# Patient Record
Sex: FEMALE | Employment: FULL TIME | URBAN - METROPOLITAN AREA
[De-identification: names, ages, dates, MRNs, and addresses within clinical notes are randomized per-mention and may not be internally consistent; named-entity substitution may affect disease eponyms.]

---

## 2023-07-20 ENCOUNTER — OFFICE VISIT (OUTPATIENT)
Dept: PRIMARY CARE | Facility: CLINIC | Age: 48
End: 2023-07-20
Payer: COMMERCIAL

## 2023-07-20 VITALS
HEART RATE: 79 BPM | BODY MASS INDEX: 44.26 KG/M2 | RESPIRATION RATE: 18 BRPM | WEIGHT: 240.5 LBS | SYSTOLIC BLOOD PRESSURE: 126 MMHG | OXYGEN SATURATION: 98 % | TEMPERATURE: 98.6 F | HEIGHT: 62 IN | DIASTOLIC BLOOD PRESSURE: 84 MMHG

## 2023-07-20 DIAGNOSIS — Z00.00 PHYSICAL EXAM: Primary | ICD-10-CM

## 2023-07-20 PROCEDURE — 1036F TOBACCO NON-USER: CPT | Performed by: NURSE PRACTITIONER

## 2023-07-20 PROCEDURE — 99396 PREV VISIT EST AGE 40-64: CPT | Performed by: NURSE PRACTITIONER

## 2023-07-20 ASSESSMENT — PATIENT HEALTH QUESTIONNAIRE - PHQ9
2. FEELING DOWN, DEPRESSED OR HOPELESS: NOT AT ALL
SUM OF ALL RESPONSES TO PHQ9 QUESTIONS 1 AND 2: 0
1. LITTLE INTEREST OR PLEASURE IN DOING THINGS: NOT AT ALL

## 2023-07-20 ASSESSMENT — PAIN SCALES - GENERAL: PAINLEVEL: 0-NO PAIN

## 2023-07-20 NOTE — PROGRESS NOTES
"Subjective   Patient ID: Luma Schroeder is a 47 y.o. female who presents for Establish Care (Reports she hasn't been to a PCP in years. New patient physical. ).    HPI     Patient presents to clinic to establish care.  She tells me she was last seen by PCP approximately 10-11 years ago.    Past medical history of transient global anemia-States she lost her memory for 10 hours was seen in the ER and a neurologist had a work-up with negative results.  She states she had these episodes in 2018, 2019 and 2021, also history of  Plantar fasciitis.  Denies taking any daily medications.    Today she complains of leg pain in the thighs that hurts mostly at night.  She previously had some knee pain seen by chiropractor had an adjustment in the pain in her legs resolved.  She states she has a very physical job and is on her feet for many hours.    She tells me she will be going to a retreat in [place] and needs a letter stating that she is healthy enough to travel.       Declines mammogram   Immunizations:   COVID-vaccine x2x1 booster   Declines Tdap.      Appetite normal bowel and bladder normal.    Review of Systems   Musculoskeletal:         See HPI   All other systems reviewed and are negative.      Objective   /84 (BP Location: Right arm, Patient Position: Sitting, BP Cuff Size: Large adult)   Pulse 79   Temp 37 °C (98.6 °F) (Temporal)   Resp 18   Ht 1.575 m (5' 2\")   Wt 109 kg (240 lb 8 oz)   SpO2 98%   BMI 43.99 kg/m²     Physical Exam  Vitals reviewed.   Constitutional:       Appearance: Normal appearance. She is not ill-appearing.   HENT:      Right Ear: Tympanic membrane and external ear normal.      Left Ear: Tympanic membrane and external ear normal.      Mouth/Throat:      Mouth: Mucous membranes are moist.      Pharynx: Oropharynx is clear.   Eyes:      Pupils: Pupils are equal, round, and reactive to light.   Neck:      Thyroid: No thyroid mass, thyromegaly or thyroid tenderness. "   Cardiovascular:      Rate and Rhythm: Normal rate and regular rhythm.   Pulmonary:      Effort: Pulmonary effort is normal.      Breath sounds: Normal breath sounds.   Abdominal:      General: Bowel sounds are normal.      Palpations: Abdomen is soft.   Musculoskeletal:         General: No swelling or tenderness. Normal range of motion.      Cervical back: Normal range of motion and neck supple.      Right lower leg: No edema.      Left lower leg: No edema.   Skin:     General: Skin is warm and dry.   Neurological:      Mental Status: She is alert and oriented to person, place, and time.   Psychiatric:         Mood and Affect: Mood normal.         Thought Content: Thought content normal.         Assessment/Plan   Problem List Items Addressed This Visit    None  Visit Diagnoses       Physical exam    -  Primary    Relevant Orders    CBC    Comprehensive Metabolic Panel    Lipid Panel    Hemoglobin A1C    Tsh With Reflex To Free T4 If Abnormal    Vitamin D 25-Hydroxy,Total    Referral to Obstetrics / Gynecology    Magnesium

## 2023-07-20 NOTE — PATIENT INSTRUCTIONS
Healthy diet and drink plenty of water.  Please have labs drawn-You will be notified with abnormal results only.    SEE MY CHART FOR RESULTS.- If you have any questions please do not hesitate to notify our office.    Please schedule all necessary health screenings ophthalmology and dentist.    Follow-up in 6 MONTHS TO A YEAR SOONER IF NEEDED.  Call office any new concerns.

## 2023-07-25 ENCOUNTER — LAB (OUTPATIENT)
Dept: LAB | Facility: LAB | Age: 48
End: 2023-07-25
Payer: COMMERCIAL

## 2023-07-25 DIAGNOSIS — Z00.00 PHYSICAL EXAM: ICD-10-CM

## 2023-07-25 LAB
ALANINE AMINOTRANSFERASE (SGPT) (U/L) IN SER/PLAS: 7 U/L (ref 7–45)
ALBUMIN (G/DL) IN SER/PLAS: 4.2 G/DL (ref 3.4–5)
ALKALINE PHOSPHATASE (U/L) IN SER/PLAS: 85 U/L (ref 33–110)
ANION GAP IN SER/PLAS: 14 MMOL/L (ref 10–20)
ASPARTATE AMINOTRANSFERASE (SGOT) (U/L) IN SER/PLAS: 9 U/L (ref 9–39)
BILIRUBIN TOTAL (MG/DL) IN SER/PLAS: 0.3 MG/DL (ref 0–1.2)
CALCIDIOL (25 OH VITAMIN D3) (NG/ML) IN SER/PLAS: 34 NG/ML
CALCIUM (MG/DL) IN SER/PLAS: 8.9 MG/DL (ref 8.6–10.3)
CARBON DIOXIDE, TOTAL (MMOL/L) IN SER/PLAS: 25 MMOL/L (ref 21–32)
CHLORIDE (MMOL/L) IN SER/PLAS: 106 MMOL/L (ref 98–107)
CHOLESTEROL (MG/DL) IN SER/PLAS: 178 MG/DL (ref 0–199)
CHOLESTEROL IN HDL (MG/DL) IN SER/PLAS: 40 MG/DL
CHOLESTEROL/HDL RATIO: 4.5
CREATININE (MG/DL) IN SER/PLAS: 0.76 MG/DL (ref 0.5–1.05)
ERYTHROCYTE DISTRIBUTION WIDTH (RATIO) BY AUTOMATED COUNT: 13.4 % (ref 11.5–14.5)
ERYTHROCYTE MEAN CORPUSCULAR HEMOGLOBIN CONCENTRATION (G/DL) BY AUTOMATED: 32.2 G/DL (ref 32–36)
ERYTHROCYTE MEAN CORPUSCULAR VOLUME (FL) BY AUTOMATED COUNT: 88 FL (ref 80–100)
ERYTHROCYTES (10*6/UL) IN BLOOD BY AUTOMATED COUNT: 4.58 X10E12/L (ref 4–5.2)
ESTIMATED AVERAGE GLUCOSE FOR HBA1C: 111 MG/DL
GFR FEMALE: >90 ML/MIN/1.73M2
GLUCOSE (MG/DL) IN SER/PLAS: 80 MG/DL (ref 74–99)
HEMATOCRIT (%) IN BLOOD BY AUTOMATED COUNT: 40.4 % (ref 36–46)
HEMOGLOBIN (G/DL) IN BLOOD: 13 G/DL (ref 12–16)
HEMOGLOBIN A1C/HEMOGLOBIN TOTAL IN BLOOD: 5.5 %
LDL: 115 MG/DL (ref 0–99)
LEUKOCYTES (10*3/UL) IN BLOOD BY AUTOMATED COUNT: 10.3 X10E9/L (ref 4.4–11.3)
MAGNESIUM (MG/DL) IN SER/PLAS: 1.91 MG/DL (ref 1.6–2.4)
NRBC (PER 100 WBCS) BY AUTOMATED COUNT: 0 /100 WBC (ref 0–0)
PLATELETS (10*3/UL) IN BLOOD AUTOMATED COUNT: 292 X10E9/L (ref 150–450)
POTASSIUM (MMOL/L) IN SER/PLAS: 4.5 MMOL/L (ref 3.5–5.3)
PROTEIN TOTAL: 6.6 G/DL (ref 6.4–8.2)
SODIUM (MMOL/L) IN SER/PLAS: 140 MMOL/L (ref 136–145)
THYROTROPIN (MIU/L) IN SER/PLAS BY DETECTION LIMIT <= 0.05 MIU/L: 1.23 MIU/L (ref 0.44–3.98)
TRIGLYCERIDE (MG/DL) IN SER/PLAS: 115 MG/DL (ref 0–149)
UREA NITROGEN (MG/DL) IN SER/PLAS: 14 MG/DL (ref 6–23)
VLDL: 23 MG/DL (ref 0–40)

## 2023-07-25 PROCEDURE — 82306 VITAMIN D 25 HYDROXY: CPT

## 2023-07-25 PROCEDURE — 85027 COMPLETE CBC AUTOMATED: CPT

## 2023-07-25 PROCEDURE — 36415 COLL VENOUS BLD VENIPUNCTURE: CPT

## 2023-07-25 PROCEDURE — 80053 COMPREHEN METABOLIC PANEL: CPT

## 2023-07-25 PROCEDURE — 80061 LIPID PANEL: CPT

## 2023-07-25 PROCEDURE — 83735 ASSAY OF MAGNESIUM: CPT

## 2023-07-25 PROCEDURE — 83036 HEMOGLOBIN GLYCOSYLATED A1C: CPT

## 2023-07-25 PROCEDURE — 84443 ASSAY THYROID STIM HORMONE: CPT

## 2023-07-26 ENCOUNTER — TELEPHONE (OUTPATIENT)
Dept: PRIMARY CARE | Facility: CLINIC | Age: 48
End: 2023-07-26
Payer: COMMERCIAL

## 2023-07-26 NOTE — TELEPHONE ENCOUNTER
----- Message from MINISTERIO Thakkar sent at 7/26/2023  7:40 AM EDT -----  Regarding: Labs  Please notify patient that all labs are normal except minor elevation noted with cholesterol level LDL.  Advised healthy lifestyle changes with a low-cholesterol low-fat diet eating more plant-based foods and exercising regularly.  ----- Message -----  From: Lab, Background User  Sent: 7/25/2023   2:43 PM EDT  To: MINISTERIO Thakkar

## 2023-10-28 ENCOUNTER — OFFICE VISIT (OUTPATIENT)
Dept: URGENT CARE | Facility: CLINIC | Age: 48
End: 2023-10-28
Payer: COMMERCIAL

## 2023-10-28 VITALS
DIASTOLIC BLOOD PRESSURE: 97 MMHG | RESPIRATION RATE: 18 BRPM | OXYGEN SATURATION: 98 % | HEART RATE: 88 BPM | TEMPERATURE: 98.3 F | SYSTOLIC BLOOD PRESSURE: 140 MMHG

## 2023-10-28 DIAGNOSIS — B96.89 BACTERIAL VAGINOSIS: ICD-10-CM

## 2023-10-28 DIAGNOSIS — N30.01 ACUTE CYSTITIS WITH HEMATURIA: Primary | ICD-10-CM

## 2023-10-28 DIAGNOSIS — N76.0 ACUTE VAGINITIS: ICD-10-CM

## 2023-10-28 DIAGNOSIS — N76.0 BACTERIAL VAGINOSIS: ICD-10-CM

## 2023-10-28 LAB
POC APPEARANCE, URINE: ABNORMAL
POC BILIRUBIN, URINE: NEGATIVE
POC BLOOD, URINE: ABNORMAL
POC COLOR, URINE: YELLOW
POC GLUCOSE, URINE: NEGATIVE MG/DL
POC KETONES, URINE: NEGATIVE MG/DL
POC LEUKOCYTES, URINE: ABNORMAL
POC NITRITE,URINE: POSITIVE
POC PH, URINE: 6 PH
POC PROTEIN, URINE: ABNORMAL MG/DL
POC SPECIFIC GRAVITY, URINE: 1.02
POC UROBILINOGEN, URINE: 0.2 EU/DL

## 2023-10-28 PROCEDURE — 1036F TOBACCO NON-USER: CPT | Performed by: PHYSICIAN ASSISTANT

## 2023-10-28 PROCEDURE — 87186 SC STD MICRODIL/AGAR DIL: CPT

## 2023-10-28 PROCEDURE — 87205 SMEAR GRAM STAIN: CPT

## 2023-10-28 PROCEDURE — 87086 URINE CULTURE/COLONY COUNT: CPT

## 2023-10-28 PROCEDURE — 81002 URINALYSIS NONAUTO W/O SCOPE: CPT | Performed by: PHYSICIAN ASSISTANT

## 2023-10-28 PROCEDURE — 87661 TRICHOMONAS VAGINALIS AMPLIF: CPT

## 2023-10-28 PROCEDURE — 87591 N.GONORRHOEAE DNA AMP PROB: CPT

## 2023-10-28 PROCEDURE — 87800 DETECT AGNT MULT DNA DIREC: CPT

## 2023-10-28 PROCEDURE — 87491 CHLMYD TRACH DNA AMP PROBE: CPT

## 2023-10-28 PROCEDURE — 99204 OFFICE O/P NEW MOD 45 MIN: CPT | Performed by: PHYSICIAN ASSISTANT

## 2023-10-28 RX ORDER — CEPHALEXIN 500 MG/1
500 CAPSULE ORAL 4 TIMES DAILY
Qty: 40 CAPSULE | Refills: 0 | Status: SHIPPED
Start: 2023-10-28 | End: 2023-10-31

## 2023-10-28 ASSESSMENT — ENCOUNTER SYMPTOMS
DYSURIA: 1
RHINORRHEA: 0
NEUROLOGICAL NEGATIVE: 1
FATIGUE: 0
ENDOCRINE NEGATIVE: 1
ACTIVITY CHANGE: 0
PSYCHIATRIC NEGATIVE: 1
WOUND: 0
BACK PAIN: 0
DIARRHEA: 0
COLOR CHANGE: 0
WHEEZING: 0
ABDOMINAL PAIN: 0
HEMATOLOGIC/LYMPHATIC NEGATIVE: 1
VOMITING: 0
BLOOD IN STOOL: 0
EYE DISCHARGE: 0
APPETITE CHANGE: 0
SORE THROAT: 0
EYE PAIN: 0
COUGH: 0
FREQUENCY: 1
NAUSEA: 0
FEVER: 0
SINUS PRESSURE: 0
ARTHRALGIAS: 0
SHORTNESS OF BREATH: 0
EYE REDNESS: 0
ALLERGIC/IMMUNOLOGIC NEGATIVE: 1
FLANK PAIN: 0

## 2023-10-28 NOTE — PROGRESS NOTES
Subjective   Patient ID: Luma Schroeder is a 48 y.o. female who presents for Back Pain.  Patient endorses dysuria, vaginal irritation.  She also notes that she recently started a new sexual relationship.  She is concerned about STIs.  She denies any fevers or chills any nausea vomiting diarrhea or abdominal pain.  Denies any chest pain or shortness of breath    Past Medical History:   Diagnosis Date    H/O tinnitus     Vertigo        Review of Systems   Constitutional:  Negative for activity change, appetite change, fatigue and fever.   HENT:  Negative for congestion, rhinorrhea, sinus pressure and sore throat.    Eyes:  Negative for pain, discharge and redness.   Respiratory:  Negative for cough, shortness of breath and wheezing.    Cardiovascular:  Negative for chest pain and leg swelling.   Gastrointestinal:  Negative for abdominal pain, blood in stool, diarrhea, nausea and vomiting.   Endocrine: Negative.    Genitourinary:  Positive for dysuria, frequency, vaginal discharge and vaginal pain. Negative for flank pain.   Musculoskeletal:  Negative for arthralgias, back pain and gait problem.   Skin:  Negative for color change, rash and wound.   Allergic/Immunologic: Negative.    Neurological: Negative.    Hematological: Negative.    Psychiatric/Behavioral: Negative.         Objective   BP (!) 140/97   Pulse 88   Temp 36.8 °C (98.3 °F)   Resp 18   SpO2 98%   Physical Exam  Constitutional:       General: She is not in acute distress.     Appearance: Normal appearance. She is not ill-appearing, toxic-appearing or diaphoretic.   HENT:      Head: Normocephalic and atraumatic.      Mouth/Throat:      Mouth: Mucous membranes are moist.      Pharynx: Oropharynx is clear.   Eyes:      Conjunctiva/sclera: Conjunctivae normal.   Cardiovascular:      Rate and Rhythm: Normal rate and regular rhythm.      Heart sounds: No murmur heard.  Pulmonary:      Effort: Pulmonary effort is normal. No respiratory distress.      Breath  sounds: Normal breath sounds. No wheezing.   Genitourinary:     Pubic Area: No rash.       Vagina: No signs of injury. Vaginal discharge and tenderness present. No erythema, bleeding or lesions.   Musculoskeletal:         General: No swelling, tenderness, deformity or signs of injury. Normal range of motion.      Cervical back: Normal range of motion and neck supple.   Skin:     General: Skin is warm and dry.      Findings: No erythema or rash.   Neurological:      General: No focal deficit present.      Mental Status: She is alert and oriented to person, place, and time.      Gait: Gait normal.         Assessment/Plan   Problem List Items Addressed This Visit       Acute cystitis with hematuria - Primary    Relevant Medications    cephalexin (Keflex) 500 mg capsule    Other Relevant Orders    POCT UA (nonautomated w/o microscopy) manually resulted    Urine culture    Acute vaginitis    Relevant Orders    C. trachomatis + N. gonorrhoeae, Amplified    TRICH VAGINALIS, AMPLIFIED    Vaginitis Gram Stain For Bacterial Vaginosis + Yeast   Patient is with some mild clear vaginal discharge.  She has some labial irritation no significant vaginal mucosal erythema or lesions.  Cervix without any friability or erythema.  Sending testing to the lab for gonorrhea chlamydia trichomonas and vaginitis Gram stain.  Also sending urine for culture.  Starting the patient on Keflex for acute cystitis and patient does have follow-up with her gynecology next week    Addendum October 30 of 9:23 AM  Reviewed patient's results, patient testing positive for bacterial vaginosis, I did leave voicemail for the patient to call our office back regarding the results.  I have sent Flagyl to the patient's pharmacy

## 2023-10-29 PROBLEM — S99.922A: Status: ACTIVE | Noted: 2018-08-02

## 2023-10-29 PROBLEM — M72.2 PLANTAR FASCIITIS OF RIGHT FOOT: Status: ACTIVE | Noted: 2018-08-02

## 2023-10-29 LAB
C TRACH RRNA SPEC QL NAA+PROBE: NEGATIVE
CLUE CELLS VAG LPF-#/AREA: PRESENT /[LPF]
N GONORRHOEA DNA SPEC QL PROBE+SIG AMP: NEGATIVE
NUGENT SCORE: 8
T VAGINALIS RRNA SPEC QL NAA+PROBE: NEGATIVE
YEAST VAG WET PREP-#/AREA: ABNORMAL

## 2023-10-29 RX ORDER — NEOMYCIN SULFATE, POLYMYXIN B SULFATE AND DEXAMETHASONE 3.5; 10000; 1 MG/ML; [USP'U]/ML; MG/ML
SUSPENSION/ DROPS OPHTHALMIC
COMMUNITY
Start: 2023-10-11 | End: 2023-10-31

## 2023-10-29 RX ORDER — NAPROXEN 500 MG/1
500 TABLET ORAL
COMMUNITY
End: 2023-10-31

## 2023-10-30 RX ORDER — METRONIDAZOLE 500 MG/1
500 TABLET ORAL 2 TIMES DAILY
Qty: 14 TABLET | Refills: 0 | Status: SHIPPED | OUTPATIENT
Start: 2023-10-30 | End: 2023-11-06

## 2023-10-31 ENCOUNTER — OFFICE VISIT (OUTPATIENT)
Dept: OBSTETRICS AND GYNECOLOGY | Facility: CLINIC | Age: 48
End: 2023-10-31
Payer: COMMERCIAL

## 2023-10-31 VITALS
HEIGHT: 62 IN | SYSTOLIC BLOOD PRESSURE: 118 MMHG | WEIGHT: 240.5 LBS | BODY MASS INDEX: 44.26 KG/M2 | DIASTOLIC BLOOD PRESSURE: 82 MMHG

## 2023-10-31 DIAGNOSIS — N30.00 ACUTE CYSTITIS WITHOUT HEMATURIA: ICD-10-CM

## 2023-10-31 DIAGNOSIS — Z01.419 WELL WOMAN EXAM: Primary | ICD-10-CM

## 2023-10-31 DIAGNOSIS — Z11.3 SCREEN FOR STD (SEXUALLY TRANSMITTED DISEASE): ICD-10-CM

## 2023-10-31 DIAGNOSIS — N76.0 ACUTE VAGINITIS: ICD-10-CM

## 2023-10-31 DIAGNOSIS — B00.9 HSV INFECTION: ICD-10-CM

## 2023-10-31 LAB — BACTERIA UR CULT: ABNORMAL

## 2023-10-31 PROCEDURE — 88175 CYTOPATH C/V AUTO FLUID REDO: CPT

## 2023-10-31 PROCEDURE — 87624 HPV HI-RISK TYP POOLED RSLT: CPT

## 2023-10-31 PROCEDURE — 87529 HSV DNA AMP PROBE: CPT

## 2023-10-31 PROCEDURE — 99386 PREV VISIT NEW AGE 40-64: CPT | Performed by: ADVANCED PRACTICE MIDWIFE

## 2023-10-31 PROCEDURE — 1036F TOBACCO NON-USER: CPT | Performed by: ADVANCED PRACTICE MIDWIFE

## 2023-10-31 RX ORDER — SULFAMETHOXAZOLE AND TRIMETHOPRIM 800; 160 MG/1; MG/1
1 TABLET ORAL 2 TIMES DAILY
Qty: 6 TABLET | Refills: 0 | Status: SHIPPED | OUTPATIENT
Start: 2023-10-31 | End: 2023-11-03

## 2023-10-31 RX ORDER — FLUCONAZOLE 150 MG/1
150 TABLET ORAL ONCE
Qty: 1 TABLET | Refills: 1 | Status: SHIPPED | OUTPATIENT
Start: 2023-10-31 | End: 2023-10-31

## 2023-10-31 RX ORDER — VALACYCLOVIR HYDROCHLORIDE 1 G/1
1000 TABLET, FILM COATED ORAL 2 TIMES DAILY
Qty: 20 TABLET | Refills: 0 | Status: SHIPPED | OUTPATIENT
Start: 2023-10-31 | End: 2023-11-10

## 2023-10-31 ASSESSMENT — ENCOUNTER SYMPTOMS
CONSTITUTIONAL NEGATIVE: 0
CARDIOVASCULAR NEGATIVE: 0
GASTROINTESTINAL NEGATIVE: 0
RESPIRATORY NEGATIVE: 0
HEMATOLOGIC/LYMPHATIC NEGATIVE: 0
PSYCHIATRIC NEGATIVE: 0
ENDOCRINE NEGATIVE: 0
NEUROLOGICAL NEGATIVE: 0
MUSCULOSKELETAL NEGATIVE: 0
EYES NEGATIVE: 0
ALLERGIC/IMMUNOLOGIC NEGATIVE: 0

## 2023-10-31 NOTE — PROGRESS NOTES
"Subjective   Luma Schroeder is a 48 y.o. female who presents for annual exam. Complaining of bumps on vulva just since last night - painful, but not as bad as yesterday.   The patient is sexually active - 1 male partner (new relationship), and has some concern for exposure to sexually transmitted infections. Was tested with cultures at urgent care last week - all negatives. Patient denies pain or bleeding with sex. She has no vaginal odor or irritation today.   Keflex from Saturday AM to Monday AM, switch to Flagyl on Monday.     Periods: monthly. Patient's last menstrual period was 10/17/2023 (exact date).    OBGYN hx: .  - Hypertensive disorders of pregnancy: no, gestational diabetes: no  - Contraception: ESSURE - Patient denies a personal history of migraine with aura, hypertension and blood clots.   Social History     Tobacco Use   Smoking Status Former    Packs/day: 1    Types: Cigarettes    Quit date:     Years since quittin.8   Smokeless Tobacco Never      - GYN screening history: last pap: was normal, last mammogram:  N/A . History of abnormal Pap smear: no. History of abnormal mammogram: no - has never had a mammogram.  - Family history of uterine or ovarian cancer: no. Family history of breast cancer: no    Social hx:  - Living situation:  two daughters , Occupation:  [employer] , Tobacco/alcohol/caffeine: no alcohol use and no tobacco use, and Illicit drugs: no history of illicit drug use  - Denies smoking and other toxic habits    Review of Systems     Objective   /82   Ht 1.562 m (5' 1.5\")   Wt 109 kg (240 lb 8 oz)   LMP 10/17/2023 (Exact Date)   BMI 44.71 kg/m²     General:   alert and oriented, in no acute distress   Heart: regular rate and rhythm   Lungs: clear to auscultation bilaterally   Abdomen: soft, non-tender, without masses or organomegaly   Vulva: Bartholin's, Urethra, [place]'s normal, 3 blisters appreciated   Vagina: normal mucosa, normal discharge   Cervix: " "multiparous appearance, no cervical motion tenderness, and no lesions   Uterus: normal size   Adnexa: normal adnexa     Assessment/Plan     1) Health maintenance:   Pap: Collected today . Has not had pap in >10 years. No history of abnormals.   Self breast exam encouraged - mammogram DECLINED. Patient states, \"If I am going to die, I am of the belief that there are worse things could happen.\" Discussed the benefits of early detection, and that self/clinician exams are not a substitute for imaging. Discussed different organizations recommendations on frequency of screening, and that there is a consensus that it is important for her to have one at her age. She DECLINES. Concerning characteristics of breasts reviewed - patient will call office with general concerns, any adherent lumps, skin dimpling/puckering or color changes, and any nipple discharge.  Routine follow up with PCP for health maintenance examination encouraged including TSH, cholesterol and vitamin D evaluation.    2) Contraception:  ESSURE.     3) Urine culture appreciated from outside organization   Rx for atbx sent to pharmacy    4) HSV suspicion   Valtrex sent to pharmacy  Patient does not think she wants suppressive therapy at this time, and will call with first sign of outbreak for episodic treatment going forward.     5) Diflucan for possible yeast after BV/UTI treatment is completed     All questions answered. RTC PRN and for AEs  Danna Vera CNM  "

## 2023-11-01 LAB
HSV1 DNA SKIN QL NAA+PROBE: NOT DETECTED
HSV2 DNA SKIN QL NAA+PROBE: DETECTED

## 2024-04-30 ENCOUNTER — TELEMEDICINE (OUTPATIENT)
Dept: OBSTETRICS AND GYNECOLOGY | Facility: CLINIC | Age: 49
End: 2024-04-30
Payer: COMMERCIAL

## 2024-04-30 DIAGNOSIS — A60.04 HERPES SIMPLEX VULVOVAGINITIS: Primary | ICD-10-CM

## 2024-04-30 PROCEDURE — 1036F TOBACCO NON-USER: CPT | Performed by: ADVANCED PRACTICE MIDWIFE

## 2024-04-30 PROCEDURE — 99213 OFFICE O/P EST LOW 20 MIN: CPT | Performed by: ADVANCED PRACTICE MIDWIFE

## 2024-04-30 RX ORDER — VALACYCLOVIR HYDROCHLORIDE 500 MG/1
500 TABLET, FILM COATED ORAL DAILY
Qty: 90 TABLET | Refills: 3 | Status: SHIPPED | OUTPATIENT
Start: 2024-04-30 | End: 2025-04-30

## 2024-04-30 RX ORDER — DEXTROAMPHETAMINE SACCHARATE, AMPHETAMINE ASPARTATE MONOHYDRATE, DEXTROAMPHETAMINE SULFATE AND AMPHETAMINE SULFATE 1.25; 1.25; 1.25; 1.25 MG/1; MG/1; MG/1; MG/1
5 CAPSULE, EXTENDED RELEASE ORAL
COMMUNITY
Start: 2023-12-07

## 2024-04-30 RX ORDER — DEXTROAMPHETAMINE SACCHARATE, AMPHETAMINE ASPARTATE MONOHYDRATE, DEXTROAMPHETAMINE SULFATE AND AMPHETAMINE SULFATE 5; 5; 5; 5 MG/1; MG/1; MG/1; MG/1
20 CAPSULE, EXTENDED RELEASE ORAL
COMMUNITY
Start: 2024-03-27

## 2024-04-30 NOTE — PROGRESS NOTES
Virtual or Telephone Consent    An interactive audio and video telecommunication system which permits real time communications between the patient (at the originating site) and provider (at the distant site) was utilized to provide this telehealth service.   Verbal consent was requested and obtained from Luma Schroeder on this date, 04/30/24 for a telehealth visit.     Subjective   Luma Schroeder is a 48 y.o. female who presents for HSV suppression.  2 outbreaks since primary HSV outbreak in October, 2023. Not very painful, but affects how she can sit and general energy level and wellbeing.     Objective   There were no vitals taken for this visit.    Assessment/Plan 47yo female for HSV supression  Rx provided for suppression - patient will call if she is still getting outbreaks on this regimen,  All questions answered    MICHAEL Umanzor 04/30/24

## 2025-04-17 DIAGNOSIS — A60.04 HERPES SIMPLEX VULVOVAGINITIS: ICD-10-CM

## 2025-04-27 RX ORDER — VALACYCLOVIR HYDROCHLORIDE 500 MG/1
500 TABLET, FILM COATED ORAL DAILY
Qty: 90 TABLET | Refills: 0 | Status: SHIPPED | OUTPATIENT
Start: 2025-04-27

## 2025-07-28 ENCOUNTER — APPOINTMENT (OUTPATIENT)
Dept: OBSTETRICS AND GYNECOLOGY | Facility: CLINIC | Age: 50
End: 2025-07-28
Payer: COMMERCIAL

## 2025-07-28 VITALS
BODY MASS INDEX: 43.79 KG/M2 | SYSTOLIC BLOOD PRESSURE: 112 MMHG | HEIGHT: 62 IN | WEIGHT: 238 LBS | DIASTOLIC BLOOD PRESSURE: 70 MMHG

## 2025-07-28 DIAGNOSIS — Z12.31 ENCOUNTER FOR SCREENING MAMMOGRAM FOR MALIGNANT NEOPLASM OF BREAST: ICD-10-CM

## 2025-07-28 DIAGNOSIS — Z12.11 COLON CANCER SCREENING: ICD-10-CM

## 2025-07-28 DIAGNOSIS — A60.04 HERPES SIMPLEX VULVOVAGINITIS: ICD-10-CM

## 2025-07-28 DIAGNOSIS — N95.1 MENOPAUSAL SYMPTOMS: Primary | ICD-10-CM

## 2025-07-28 DIAGNOSIS — Z00.00 HEALTHCARE MAINTENANCE: ICD-10-CM

## 2025-07-28 DIAGNOSIS — Z53.20 BREAST CANCER SCREENING DECLINED: ICD-10-CM

## 2025-07-28 PROCEDURE — 3008F BODY MASS INDEX DOCD: CPT | Performed by: STUDENT IN AN ORGANIZED HEALTH CARE EDUCATION/TRAINING PROGRAM

## 2025-07-28 PROCEDURE — 99396 PREV VISIT EST AGE 40-64: CPT | Performed by: STUDENT IN AN ORGANIZED HEALTH CARE EDUCATION/TRAINING PROGRAM

## 2025-07-28 PROCEDURE — 99214 OFFICE O/P EST MOD 30 MIN: CPT | Performed by: STUDENT IN AN ORGANIZED HEALTH CARE EDUCATION/TRAINING PROGRAM

## 2025-07-28 RX ORDER — VALACYCLOVIR HYDROCHLORIDE 500 MG/1
500 TABLET, FILM COATED ORAL DAILY
Qty: 90 TABLET | Refills: 3 | Status: SHIPPED | OUTPATIENT
Start: 2025-07-28

## 2025-07-28 RX ORDER — OMEGA-3-ACID ETHYL ESTERS 1 G/1
1 CAPSULE, LIQUID FILLED ORAL 2 TIMES DAILY
COMMUNITY

## 2025-07-28 RX ORDER — ESTRADIOL/NORETHINDRONE ACETATE TRANSDERMAL SYSTEM .05; .14 MG/D; MG/D
1 PATCH, EXTENDED RELEASE TRANSDERMAL 2 TIMES WEEKLY
Qty: 24 PATCH | Refills: 3 | Status: SHIPPED | OUTPATIENT
Start: 2025-07-28 | End: 2025-07-30

## 2025-07-28 RX ORDER — CALCITRIOL 0.25 UG/1
0.25 CAPSULE ORAL DAILY
COMMUNITY

## 2025-07-28 ASSESSMENT — PAIN SCALES - GENERAL: PAINLEVEL_OUTOF10: 0-NO PAIN

## 2025-07-28 NOTE — PROGRESS NOTES
"Luma Schroeder is a 49 y.o.  female who is here for a routine exam.     Subjective     Concerns today:     Valtrex refill.    Perimenopause- extreme fatigue. Does report continuously feeling hot, rare discrete hot flashes. No new sleep disturbances, does have cyclic insomnia related to her period. Does have night sweats. Also reports brain fog and joint pain. Still having regular periods. No history of DVT/VTE, liver dysfunction, HTN.     OB/Gyn History:  Menstrual cycle concerns: none  Sexual Health Concerns: none  Current contraception: essure    Preventative:  Mammogram: has not had, declines at this time   Last Colonoscopy:  never- desires Cologuard  DM Screenin2023 5.5  HPV vaccination: n/a  Exercise: active lifestyle, no particular  Diet: no particular  Seat Belt Use: always    Social History:  Living Situation: lives with daughter  School/Employment: works in [employer] at [employer]  Safe at Home?: Yes  Depression Screen/Mood concerns: No    Personal medical and surgical history, Family history (specifically breast, ovarian, colon cancer), OB/GYN history and Substance use reviewed and updated in chart.   Pap history reviewed.         Objective   /70   Ht 1.562 m (5' 1.5\")   Wt 108 kg (238 lb)   LMP 2025   BMI 44.24 kg/m²   Physical Exam  Constitutional:       General: She is not in acute distress.     Appearance: Normal appearance.   HENT:      Head: Normocephalic.     Cardiovascular:      Rate and Rhythm: Normal rate.   Pulmonary:      Effort: Pulmonary effort is normal. No respiratory distress.     Skin:     General: Skin is warm and dry.     Neurological:      Mental Status: She is alert and oriented to person, place, and time.     Psychiatric:         Mood and Affect: Mood normal.         Behavior: Behavior normal.         Thought Content: Thought content normal.         Judgment: Judgment normal.      Declines breast and pelvic exam today.       Assessment & Plan  Herpes simplex " vulvovaginitis  Valtrex refill- daily suppression  Orders:    valACYclovir (Valtrex) 500 mg tablet; Take 1 tablet (500 mg) by mouth once daily.    Menopausal symptoms  Discussed various etiologies for menopausal symptomatology and hot flashes, those that include hormonal considerations and those that do not.  Simple changes like layering of clothing, availability of fans, etc, may help. Women who get more aerobic exercise weekly also experience fewer hot flashes and those are also often less severe, and thus aerobic exercise encouraged given its positive benefits on all aspects of healthy living.   The patient was counseled today regarding the indications, risks, benefits, and alternatives of hormone replacement therapy and estrogen replacement therapy. The patient was counseled regarding risks including, but not limited to: changes in memory, mood, and libido; HRT effect on risks of stroke, MI or coronary artery disease, thrombotic events; and risks of neoplasia including breast cancers as a result of using hormone replacement therapy. She was counseled regarding nonhormonal medical alternatives both prescription and over the counter as well as nonmedical options. Timing hypothesis discussed, as timing on the initiation of HRT/ERT may be very important in risk assessment. She was encouraged to use the lowest dose of hormonal therapy possible for the shortest amount of time possible and that she should notify us of any changes in her medical history.  She was counseled on expected bleeding patterns and advised of how to notify us of any concerns or unexpected patterns or amount of bleeding or discharge.    Cardiovascular risk factor calculator: 1.22  Breast Cancer Risk: 0.9%/8%  The WHI showed a slightly increased risk of breast cancer, coronary heart disease, stroke, and VTE and a decreased risk of fracture and colon cancer after an average of 5 years of combined HRT. Among women who received estrogen only, there  was an increased risk of thromboembolic events, but not an increased risk of cardiovascular events and breast cancer. That said, a reanalysis of the WHI results from women younger than 61 yo and within 10 years of menopause suggests a possible cardioprotective effect of HRT and improved all cause mortality.   Discussed common side effects including, but not limited to, breast tenderness, vaginal spotting, bloating, and headaches.   Contraindications for HRT discussed, including history of breast cancer, coronary heart disease, history of VTE or stroke, or active liver disease.   Discussed use of Paroxetine (20mg qPM), Gabapentin (600-900 mg/d), clonidine (0.1 mg/d), for vasomotor symptoms.      At this time, patient wishes to use Combipatch. Rx sent.  Return to clinic in 3-6 months to review response.   Orders:    estradiol-norethindrone acet (CombiPatch) 0.05-0.14 mg/24 hr; Place 1 patch over 96 hours on the skin 2 times a week.    Colon cancer screening  Declines colonoscopy  Counseled on cologuard and accepts  Orders:    Cologuard® colon cancer screening; Future    Breast cancer screening declined  Declines mammogram  Counseled on benefits of screening and timing  To notify clinic if she desires       Healthcare maintenance  - Reviewed healthy lifestyle including well rounded diet and exercise (moderate intensity 150min/week; high intensity 75min/week)  - Immunizations: UTD  - Pap UTD  - STI testing declined         Dexter Mays MD  , Obstetrics and Gynecology  [facility]

## 2025-07-30 DIAGNOSIS — N95.1 MENOPAUSAL SYMPTOMS: Primary | ICD-10-CM

## 2025-07-30 RX ORDER — ESTRADIOL 0.05 MG/D
1 PATCH TRANSDERMAL WEEKLY
Qty: 12 PATCH | Refills: 3 | Status: SHIPPED | OUTPATIENT
Start: 2025-07-30 | End: 2026-07-30

## 2025-07-30 RX ORDER — PROGESTERONE 100 MG/1
100 CAPSULE ORAL NIGHTLY
Qty: 90 CAPSULE | Refills: 3 | Status: SHIPPED | OUTPATIENT
Start: 2025-07-30 | End: 2026-07-30

## 2025-09-02 LAB — NONINV COLON CA DNA+OCC BLD SCRN STL QL: NEGATIVE

## 2025-09-03 ENCOUNTER — APPOINTMENT (OUTPATIENT)
Dept: OBSTETRICS AND GYNECOLOGY | Facility: CLINIC | Age: 50
End: 2025-09-03
Payer: COMMERCIAL

## 2025-10-27 ENCOUNTER — APPOINTMENT (OUTPATIENT)
Dept: OBSTETRICS AND GYNECOLOGY | Facility: CLINIC | Age: 50
End: 2025-10-27
Payer: COMMERCIAL